# Patient Record
Sex: FEMALE | Race: WHITE | NOT HISPANIC OR LATINO | URBAN - METROPOLITAN AREA
[De-identification: names, ages, dates, MRNs, and addresses within clinical notes are randomized per-mention and may not be internally consistent; named-entity substitution may affect disease eponyms.]

---

## 2022-12-25 ENCOUNTER — LAB OUTSIDE AN ENCOUNTER (OUTPATIENT)
Dept: URBAN - METROPOLITAN AREA CLINIC 92 | Facility: CLINIC | Age: 67
End: 2022-12-25

## 2022-12-25 ENCOUNTER — TELEPHONE ENCOUNTER (OUTPATIENT)
Dept: URBAN - METROPOLITAN AREA CLINIC 92 | Facility: CLINIC | Age: 67
End: 2022-12-25

## 2022-12-25 PROBLEM — 30188007 ALPHA-1-ANTITRYPSIN DEFICIENCY: Status: ACTIVE | Noted: 2022-12-25

## 2022-12-25 NOTE — HPI-TODAY'S VISIT:
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  Also noted to have fatty liver. Patient did labs in 2017 showing white blood cell count 7.1 hemoglobin 13.4 plate count 325 bilirubin 0.3 albumin 3.9 alk phos 130 AST 33 and ALT 85. July 2016 MRI no cirrhosis and liver vessel patent.  Gallbladder absent.  Spleen not enlarged.  Kidney cysts were seen.  Hemangioma seen at T12 vertebral body.  Small hiatal hernia. Prior visit in 2019 with significant reflux issues noted and was being assessed by  regarding same. Prior ultrasound had shown there was slightly increased echogenicity no lesions and no dilated ducts.  Spleen was 7.7 cm.  She was due for hep B vaccine but had not received in the past. We last seen was noted to be 89 with a BMI of 26.61.  1.	Abnormal liver labs noted with both fatty liver risk factor as well as alpha-1 assess deficiency noted.  Needs to continue to work on her weight and diet and exercise and try to optimize her issues.  Seen by pulmonary Dr. Mckinney in the past.  Needs updated labs and imaging.  Needs to be stressed importance of at least yearly imaging. 2.	History liver biopsy done in 2006 in [place]. 3.	Fatty liver noted needs to work on risk factors same including optimize weight, diet, exercise.  Even 30 minutes a day can be important of walking to help with fatty liver. 4.	Alpha-1 SS deficiency noted.  Runs in the family.  Many family members afflicted with same. 5.	History of benign breast biopsy noted x4 in the past. 6.	Overweight status noted and needs to continue to work on same. 7.	History of cervical cancer noted in remission. 8.	History cholecystectomy 2007 noted. 9.	Hyperlipidemia history noted and needs to follow-up with primary provider since that is about a risk factor for fatty liver.  Needs to be treated if needed for this.  If diet controlled and that would be also appropriate.  Vaccine counseling needs hep B vaccine.  She is immune to get hep A.  She does not do the flu shot. 10.	Vitamin D deficiency follows with local providers. 11.	History colonoscopy done in the past told to redo in 10 years. 12.	Dysphagia and GERD symptoms has been seen by local GI as well as GI here. Plan 1.	_update labs. 2.	_update imaging. 3.	Needs to be seen at least once a year if not twice a year if suspected more advanced disease.  Stressed to pt the need for social distancing and strict handwashing and wearing a mask and to follow any other new or added CDC recommendations as this is an evolving target.  Duration of the visit was 0 min with 20 min of chart prep and 20 minutes for this face to face/TeleMed visit with time reviewing their prior and recent records and labs and discussing their current status and future plans for care for the patient.

## 2023-01-06 ENCOUNTER — WEB ENCOUNTER (OUTPATIENT)
Dept: URBAN - METROPOLITAN AREA CLINIC 86 | Facility: CLINIC | Age: 68
End: 2023-01-06

## 2023-01-06 ENCOUNTER — OFFICE VISIT (OUTPATIENT)
Dept: URBAN - METROPOLITAN AREA CLINIC 86 | Facility: CLINIC | Age: 68
End: 2023-01-06
Payer: SELF-PAY

## 2023-01-06 ENCOUNTER — LAB OUTSIDE AN ENCOUNTER (OUTPATIENT)
Dept: URBAN - METROPOLITAN AREA CLINIC 86 | Facility: CLINIC | Age: 68
End: 2023-01-06

## 2023-01-06 ENCOUNTER — DASHBOARD ENCOUNTERS (OUTPATIENT)
Age: 68
End: 2023-01-06

## 2023-01-06 ENCOUNTER — TELEPHONE ENCOUNTER (OUTPATIENT)
Dept: URBAN - METROPOLITAN AREA CLINIC 92 | Facility: CLINIC | Age: 68
End: 2023-01-06

## 2023-01-06 VITALS
HEIGHT: 64 IN | WEIGHT: 148 LBS | SYSTOLIC BLOOD PRESSURE: 120 MMHG | HEART RATE: 63 BPM | BODY MASS INDEX: 25.27 KG/M2 | DIASTOLIC BLOOD PRESSURE: 64 MMHG | TEMPERATURE: 96.66 F

## 2023-01-06 DIAGNOSIS — Z85.41 HX OF CERVICAL CANCER: ICD-10-CM

## 2023-01-06 DIAGNOSIS — E88.01 CARRIER OF ALPHA-1-ANTITRYPSIN DEFICIENCY: ICD-10-CM

## 2023-01-06 DIAGNOSIS — E78.5 HYPERLIPIDEMIA: ICD-10-CM

## 2023-01-06 DIAGNOSIS — E66.3 OVERWEIGHT: ICD-10-CM

## 2023-01-06 DIAGNOSIS — K76.0 FATTY LIVER: ICD-10-CM

## 2023-01-06 DIAGNOSIS — R74.8 ABNORMAL LIVER ENZYMES: ICD-10-CM

## 2023-01-06 DIAGNOSIS — K21.9 GERD: ICD-10-CM

## 2023-01-06 DIAGNOSIS — E55.9 VITAMIN D DEFICIENCY: ICD-10-CM

## 2023-01-06 PROBLEM — 235595009 GASTROESOPHAGEAL REFLUX DISEASE: Status: ACTIVE | Noted: 2022-12-25

## 2023-01-06 PROBLEM — 238131007 OVERWEIGHT: Status: ACTIVE | Noted: 2022-12-25

## 2023-01-06 PROBLEM — 197321007 FATTY LIVER: Status: ACTIVE | Noted: 2022-12-25

## 2023-01-06 PROBLEM — 30188007 ALPHA-1-ANTITRYPSIN DEFICIENCY: Status: ACTIVE | Noted: 2022-12-25

## 2023-01-06 PROCEDURE — 99205 OFFICE O/P NEW HI 60 MIN: CPT

## 2023-01-06 NOTE — EXAM-PHYSICAL EXAM
Gen: awake and responsive. Eyes: anicteric, normal lids. Mouth: covered with mask. Nose: covered with mask. Hearing: intact grossly. Neck: trachea midline and no jvd. CV: RRR no s3. Lungs: clear. No wheezes, Abd: Soft, nabs, NR, NT. No  liver or spleen enlargement. Ext: some leg edema, some palm erythema. Neuro: moves all 4 ext grossly. No asterixis. Skin: no pruritis and some palm erythema.

## 2023-01-06 NOTE — HPI-TODAY'S VISIT:
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    Last visit,  Patient did labs in 2017 showing white blood cell count 7.1 hemoglobin 13.4 platelet count 325 bilirubin 0.3 albumin 3.9 alk phos 130 AST 33 and ALT 85.  July 2016 MRI no cirrhosis and liver vessel patent.  Gallbladder absent.  Spleen not enlarged.  Kidney cysts were seen.  Hemangioma seen at T12 vertebral body.  Small hiatal hernia.  Prior visit in 2019 with significant reflux issues noted and was being assessed by  regarding same. Since did the  surgery and this was done at [place].  Prior ultrasound had shown there was slightly increased echogenicity no lesions and no dilated ducts.  Spleen was 7.7 cm.  She was due for hep B vaccine but had not received in the past.  We last seen was noted to be 89 with a BMI of 26.61 in 2019 and 145 and down in 2023.  She did colon a few years ago and no polyps.   Need labs and imaging.,  Plan 1.	Needed to update labs. 2.	Needing to do the update imaging and do in [facility]. 3.	Needs to be seen at least once a year if not twice a year if suspected more advanced disease. Use telemed in between. 4.     Staying on the low fat diet and healthy can help.  Stressed to pt the need for social distancing and strict handwashing and wearing a mask and to follow any other new or added CDC recommendations as this is an evolving target.   Duration of the visit was 70  min with 40 min of chart prep getting old gw info and transferring to the ecw and setting up her chart and info for her visit today and reviewing last notes and 30 minutes for this face to face visit with time reviewing their prior and recent records and labs and discussing their current status and future plans for care for the patient.

## 2023-01-06 NOTE — HPI-TODAY'S VISIT:
Dear Deborah Anne, January 6 labs show sodium 137 potassium 4.2 chloride 103 CO2 27 calcium 9.1 BUN of 16 creatinine 0.51 which is actually slightly low.  Sugar was normal at 87.  Back in November 2019 creatinine was 0.59 and glucose 130 elevated. Albumin was normal at 4.0, alkaline phosphatase normal at 90, AST was 24 and ALT 22 and total bilirubin 0.4.  Great to see that these labs are normal and at ideal alt level of less than 25. Neutrophils were 3.64 and lymphocytes 1.95. White blood cell count 6.4 hemoglobin 13.8 platelet count 333 MCV was slightly up at 98.7.   May want to check your B12 and folate levels with your primary provider.  Sometimes that could be a common cause to explain this mcv elevation.  Back in November 2019 your MCV was better and almost normal at 95.6.  It does appear to have trended up.  Sometimes if you alter your diet too much, you may miss out on your B12 and folate supplies when we get to away from red meats and also avoid certain vegetables.  These labs do not suggest any advanced liver disease but we obviously are waiting now for the ultrasound.  Tried to call and could not get and not able to leave a vmail. Dr Ly

## 2023-01-25 ENCOUNTER — TELEPHONE ENCOUNTER (OUTPATIENT)
Dept: URBAN - METROPOLITAN AREA CLINIC 92 | Facility: CLINIC | Age: 68
End: 2023-01-25

## 2023-01-25 NOTE — HPI-TODAY'S VISIT:
Dear Deborah Anne, January 18 ultrasound was done at [facility] in [place] and unfortunately we just received it today. We get the [facility] ones same day. The liver had a normal size.  Prior cholecystectomy changes were seen. Common bile duct was normal at 3 mm. Spleen was normal in size. No lesions were seen in the liver or spleen. Right kidney was 11.1 cm and left 11.6 cm.  Neither kidney had any hydronephrosis. Pancreas was obscured by bowel gas. Visualized portions of the aorta and the inferior vena cava were unremarkable. Good to note. Share with local providers. Dr. Ly

## 2023-06-05 ENCOUNTER — LAB OUTSIDE AN ENCOUNTER (OUTPATIENT)
Dept: URBAN - METROPOLITAN AREA CLINIC 86 | Facility: CLINIC | Age: 68
End: 2023-06-05

## 2023-06-19 ENCOUNTER — OFFICE VISIT (OUTPATIENT)
Dept: URBAN - METROPOLITAN AREA TELEHEALTH 2 | Facility: TELEHEALTH | Age: 68
End: 2023-06-19

## 2023-06-19 NOTE — HPI-TODAY'S VISIT:
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    Dear Deborah Anne, January 18 ultrasound was done at [facility] in [place] and unfortunately we just received it today. We get the [facility] ones same day. The liver had a normal size.  Prior cholecystectomy changes were seen. Common bile duct was normal at 3 mm. Spleen was normal in size. No lesions were seen in the liver or spleen. Right kidney was 11.1 cm and left 11.6 cm.  Neither kidney had any hydronephrosis. Pancreas was obscured by bowel gas. Visualized portions of the aorta and the inferior vena cava were unremarkable. Good to note. Share with local providers. Dr. Ly Dear Deborah Anne, January 6 labs show sodium 137 potassium 4.2 chloride 103 CO2 27 calcium 9.1 BUN of 16 creatinine 0.51 which is actually slightly low.  Sugar was normal at 87.  Back in November 2019 creatinine was 0.59 and glucose 130 elevated. Albumin was normal at 4.0, alkaline phosphatase normal at 90, AST was 24 and ALT 22 and total bilirubin 0.4.  Great to see that these labs are normal and at ideal alt level of less than 25. Neutrophils were 3.64 and lymphocytes 1.95. White blood cell count 6.4 hemoglobin 13.8 platelet count 333 MCV was slightly up at 98.7.   May want to check your B12 and folate levels with your primary provider.  Sometimes that could be a common cause to explain this mcv elevation.  Back in November 2019 your MCV was better and almost normal at 95.6.  It does appear to have trended up.  Sometimes if you alter your diet too much, you may miss out on your B12 and folate supplies when we get to away from red meats and also avoid certain vegetables.  These labs do not suggest any advanced liver disease but we obviously are waiting now for the ultrasound.  Tried to call and could not get and not able to leave a vmail. Dr Ly  Last visit,  Patient did labs in 2017 showing white blood cell count 7.1 hemoglobin 13.4 platelet count 325 bilirubin 0.3 albumin 3.9 alk phos 130 AST 33 and ALT 85.  July 2016 MRI no cirrhosis and liver vessel patent.  Gallbladder absent.  Spleen not enlarged.  Kidney cysts were seen.  Hemangioma seen at T12 vertebral body.  Small hiatal hernia.  Prior visit in 2019 with significant reflux issues noted and was being assessed by  regarding same. Since did the  surgery and this was done at [place].  Prior ultrasound had shown there was slightly increased echogenicity no lesions and no dilated ducts.  Spleen was 7.7 cm.  She was due for hep B vaccine but had not received in the past.  We last seen was noted to be 89 with a BMI of 26.61 in 2019 and 145 and down in 2023.  She did colon a few years ago and no polyps.   Need labs and imaging.,  Plan 1.	Needed to update labs. 2.	Needing to do the update imaging and do in [facility]. 3.	Needs to be seen at least once a year if not twice a year if suspected more advanced disease. Use telemed in between. 4.     Staying on the low fat diet and healthy can help.  Stressed to pt the need for social distancing and strict handwashing and wearing a mask and to follow any other new or added CDC recommendations as this is an evolving target.   Duration of the visit was 0  min with 10 min of chart prep getting old gw info and transferring to the ecw and setting up her chart and info for her visit today and reviewing last notes and 20 minutes for this face to face visit with time reviewing their prior and recent records and labs and discussing their current status and future plans for care for the patient.